# Patient Record
Sex: MALE | Race: ASIAN | NOT HISPANIC OR LATINO | Employment: STUDENT | URBAN - METROPOLITAN AREA
[De-identification: names, ages, dates, MRNs, and addresses within clinical notes are randomized per-mention and may not be internally consistent; named-entity substitution may affect disease eponyms.]

---

## 2019-12-15 ENCOUNTER — NURSE TRIAGE (OUTPATIENT)
Dept: NURSING | Facility: CLINIC | Age: 24
End: 2019-12-15
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
## 2024-07-09 ENCOUNTER — APPOINTMENT (OUTPATIENT)
Dept: GENERAL RADIOLOGY | Facility: CLINIC | Age: 29
End: 2024-07-09
Attending: EMERGENCY MEDICINE
Payer: COMMERCIAL

## 2024-07-09 ENCOUNTER — HOSPITAL ENCOUNTER (EMERGENCY)
Facility: CLINIC | Age: 29
Discharge: HOME | End: 2024-07-09
Attending: EMERGENCY MEDICINE | Admitting: EMERGENCY MEDICINE
Payer: COMMERCIAL

## 2024-07-09 VITALS
OXYGEN SATURATION: 100 % | RESPIRATION RATE: 16 BRPM | DIASTOLIC BLOOD PRESSURE: 77 MMHG | HEART RATE: 66 BPM | SYSTOLIC BLOOD PRESSURE: 135 MMHG | TEMPERATURE: 98.1 F

## 2024-07-09 DIAGNOSIS — S43.004A SHOULDER DISLOCATION, RIGHT, INITIAL ENCOUNTER: ICD-10-CM

## 2024-07-09 PROCEDURE — 99283 EMERGENCY DEPT VISIT LOW MDM: CPT | Performed by: EMERGENCY MEDICINE

## 2024-07-09 PROCEDURE — 73030 X-RAY EXAM OF SHOULDER: CPT | Mod: 26 | Performed by: RADIOLOGY

## 2024-07-09 PROCEDURE — 73030 X-RAY EXAM OF SHOULDER: CPT | Mod: RT

## 2024-07-09 ASSESSMENT — ACTIVITIES OF DAILY LIVING (ADL)
ADLS_ACUITY_SCORE: 33
ADLS_ACUITY_SCORE: 35

## 2024-07-09 ASSESSMENT — COLUMBIA-SUICIDE SEVERITY RATING SCALE - C-SSRS
6. HAVE YOU EVER DONE ANYTHING, STARTED TO DO ANYTHING, OR PREPARED TO DO ANYTHING TO END YOUR LIFE?: NO
2. HAVE YOU ACTUALLY HAD ANY THOUGHTS OF KILLING YOURSELF IN THE PAST MONTH?: NO
1. IN THE PAST MONTH, HAVE YOU WISHED YOU WERE DEAD OR WISHED YOU COULD GO TO SLEEP AND NOT WAKE UP?: NO

## 2024-07-10 NOTE — ED PROVIDER NOTES
[place] EMERGENCY DEPARTMENT ([facility])    7/09/24       ED PROVIDER NOTE       History     Chief Complaint   Patient presents with    Dislocation     HPI  Vasquez Cisneros is a 28 year old left-handed male who presents to the ED with a possible right shoulder dislocation.  Patient reports that he was playing squash, leaned over for a left had a backhand shot that was near the wall, pushed his right hand up against the wall and felt his shoulder pop backwards.  It then spontaneously popped back into place, however this is never happened before for him and he was concerned so he stopped playing and went home.  When he took his shirt off at home, he felt like his shoulder popped out of place again, and then subsequently popped back into place.  He denies weakness or numbness distally.  He has not had any dislocations in the past.  He denies any pain or concerns at rest in the department during interview today.    Patient reports he was playing squash and felt a pop. When taking his shirt off later after the initial pop he heard another popping sound. He has active ROM.     Past Medical History  No past medical history on file.  No past surgical history on file.  No current outpatient medications on file.    No Known Allergies  Family History  No family history on file.  Social History       Past medical history, past surgical history, medications, allergies, family history, and social history were reviewed with the patient. No additional pertinent items.     A complete review of systems was performed with pertinent positives and negatives noted in the HPI, and all other systems negative.    Physical Exam   BP: 135/77  Pulse: 66  Temp: 98.1  F (36.7  C)  Resp: 16  SpO2: 100 %  Physical Exam  Physical Exam   Constitutional: Pt is oriented to person, place, and time.Pt appears well-developed and well-nourished.   HENT:   Head: Normocephalic and atraumatic.   Eyes: Conjunctivae are normal. Pupils are  equal, round, and reactive to light.   Neck: Normal range of motion. Neck supple.   Cardiovascular: Normal rate, regular rhythm, normal heart sounds and intact distal pulses.    Pulmonary/Chest: Effort normal and breath sounds normal. No respiratory distress. Pt has no wheezes. Pt has no rales  Abdominal: Soft. Bowel sounds are normal. Pt exhibits no distension and no mass. No tenderness. Pt has no rebound and no guarding.   Musculoskeletal: Normal range of motion. Pt exhibits no edema.   Neurological: Pt is alert and oriented to person, place, and time. Normal reflexes.   Skin: Skin is warm and dry. No rash noted.   Psychiatric: Pt has a normal mood and affect. Behavior is normal. Judgment and thought content normal.      ED Course, Procedures, & Data      Procedures                Results for orders placed or performed during the hospital encounter of 07/09/24   XR Shoulder Right G/E 3 Views     Status: None    Narrative    EXAM: XR SHOULDER RIGHT G/E 3 VIEWS  LOCATION: [facility]  DATE: 7/9/2024    INDICATION: trauma, possible right shoulder dislocation  COMPARISON: None.      Impression    IMPRESSION: No acute fracture or malalignment. There is a 4 mm chronic appearing ossicle along the anterior glenoid. Normal joint spacing.     Medications - No data to display  Labs Ordered and Resulted from Time of ED Arrival to Time of ED Departure - No data to display  XR Shoulder Right G/E 3 Views   Final Result   IMPRESSION: No acute fracture or malalignment. There is a 4 mm chronic appearing ossicle along the anterior glenoid. Normal joint spacing.             Critical care was not performed.     Medical Decision Making  The patient's presentation was of low complexity (an acute and uncomplicated illness or injury).    The patient's evaluation involved:  review of external note(s) from 1 sources (see separate area of note for details)  ordering and/or review of 1 test(s) in  this encounter (see separate area of note for details)    The patient's management necessitated only low risk treatment.    Assessment & Plan    Vasquez Cisneros is a 28 year old left-handed male who presents to the ED with a possible right shoulder dislocation.  Patient is nontoxic-appearing on exam, has vital signs within normal limits.  He has full range of motion of the shoulder with abduction, adduction, flexion, extension, and no pain with Neer test or empty can test at this time.  He has intact radial pulses 2+ on palpation bilaterally, 5 out of 5  strength bilaterally, intact sensation to light touch in distal upper extremities bilaterally.  Shoulder x-ray was obtained and negative for fracture or dislocation on my read, confirmed by radiology, showed a 4 mm chronic appearing ossicle along the anterior glenoid according to radiology with normal joint spacing. By history it sounds as if patient had a dislocated shoulder twice tonight that spontaneously reduced. Patient given a sling and ortho-sport referral. Discharged with outpatient follow up and return precautions .    I have reviewed the nursing notes. I have reviewed the findings, diagnosis, plan and need for follow up with the patient.    New Prescriptions    No medications on file       Final diagnoses:   Shoulder dislocation, right, initial encounter       Javi Viera MD  [facility] EMERGENCY DEPARTMENT  7/9/2024     Javi Viera MD  07/09/24 2057

## 2024-07-10 NOTE — ED TRIAGE NOTES
Pt arrives to ED with c/o possible right shoulder dislocation. Pt states he was playing squash and felt a pop. Later when taking his shirt of he felt another pop. Pt has active ROM. Endorses some pain. VSS

## 2024-07-14 ENCOUNTER — HEALTH MAINTENANCE LETTER (OUTPATIENT)
Age: 29
End: 2024-07-14

## 2024-07-24 ENCOUNTER — OFFICE VISIT (OUTPATIENT)
Dept: ORTHOPEDICS | Facility: CLINIC | Age: 29
End: 2024-07-24
Payer: COMMERCIAL

## 2024-07-24 ENCOUNTER — PRE VISIT (OUTPATIENT)
Dept: ORTHOPEDICS | Facility: CLINIC | Age: 29
End: 2024-07-24

## 2024-07-24 DIAGNOSIS — S43.001A SHOULDER SUBLUXATION, RIGHT, INITIAL ENCOUNTER: Primary | ICD-10-CM

## 2024-07-24 PROCEDURE — 99203 OFFICE O/P NEW LOW 30 MIN: CPT | Performed by: FAMILY MEDICINE

## 2024-07-24 NOTE — TELEPHONE ENCOUNTER
DIAGNOSIS: XR 7/9/24 Shoulder dislocation, right, initial encounter [S43.004A] / IMAGES IN CHART / Javi Viera MD / MARISEL     APPOINTMENT DATE: 7.24.24   NOTES STATUS DETAILS   DISCHARGE REPORT from the ER Internal 7.9.24  Maximiliano  [facility]   MEDICATION LIST Internal    XRAYS (IMAGES & REPORTS) Internal 7.9.24  XR Shoulder Right

## 2024-07-24 NOTE — PROGRESS NOTES
Patient acute right shoulder subluxation/dislocation episode 7/9/2024.  Playing squash, pushed his right hand up against a wall and felt his shoulder pop.  Denies a history of previous shoulder dislocation. Today, the patient reports that after that injury, he stopped playing immediately. Denies any previous episodes of instability. Denies any paresthesias. He does note improvement in his shoulder pain. He has been using a sling since being seen in the ED, he does come out of the sling from time to time to move the elbow. He is left hand dominant. He does enjoy playing squash and going to the gym.     X-ray 7/9/2024 showed no acute fracture.  Chronic appearing 4 mm ossicle at the anterior glenoid but no sign of Bankart lesion        EXAM: XR SHOULDER RIGHT G/E 3 VIEWS  LOCATION: [facility]  DATE: 7/9/2024     INDICATION: trauma, possible right shoulder dislocation  COMPARISON: None.                                                                      IMPRESSION: No acute fracture or malalignment. There is a 4 mm chronic appearing ossicle along the anterior glenoid. Normal joint spacing.            PMH:  No past medical history on file.    Active problem list:  There is no problem list on file for this patient.      FH:  No family history on file.    SH:  Social History     Socioeconomic History    Marital status:      Spouse name: Not on file    Number of children: Not on file    Years of education: Not on file    Highest education level: Not on file   Occupational History    Not on file   Tobacco Use    Smoking status: Not on file    Smokeless tobacco: Not on file   Substance and Sexual Activity    Alcohol use: Not on file    Drug use: Not on file    Sexual activity: Not on file   Other Topics Concern    Not on file   Social History Narrative    Not on file     Social Determinants of Health     Financial Resource Strain: Not on file   Food Insecurity: Not on file    Transportation Needs: Not on file   Physical Activity: Not on file   Stress: Not on file   Social Connections: Not on file   Interpersonal Safety: Not on file   Housing Stability: Not on file       MEDS:  See EMR, reviewed  ALL:  See EMR, reviewed    REVIEW OF SYSTEMS:  CONSTITUTIONAL:NEGATIVE for fever, chills, change in weight  INTEGUMENTARY/SKIN: NEGATIVE for worrisome rashes, moles or lesions  EYES: NEGATIVE for vision changes or irritation  ENT/MOUTH: NEGATIVE for ear, mouth and throat problems  RESP:NEGATIVE for significant cough or SOB  BREAST: NEGATIVE for masses, tenderness or discharge  CV: NEGATIVE for chest pain, palpitations or peripheral edema  GI: NEGATIVE for nausea, abdominal pain, heartburn, or change in bowel habits  :NEGATIVE for frequency, dysuria, or hematuria  :NEGATIVE for frequency, dysuria, or hematuria  NEURO: NEGATIVE for weakness, dizziness or paresthesias  ENDOCRINE: NEGATIVE for temperature intolerance, skin/hair changes  HEME/ALLERGY/IMMUNE: NEGATIVE for bleeding problems  PSYCHIATRIC: NEGATIVE for changes in mood or affect        Objective : He can forward flex 140 degrees and abduct 90 degrees.  5 out of 5 strength bilaterally at deltoid, supraspinatus, infraspinatus and subscapularis.  Axillary nerve function is normal on the right.  Nontender over the SCJ joint, clavicle, AC joint, anterior cuff or biceps tendon on the right.  [place]'s test is negative.  No scapular winging.  Overlying skin is normal.  Appropriate conversation and affect.     Assessment: Right shoulder subluxation     Plan: I asked him to discontinue the sling and was taught Codman's exercises to avoid frozen shoulder.  He will see physical therapy for a scapular stabilization and shoulder stabilization exercise protocol that can progress him back to playing squash.  If he does not continue to improve over the next 6 weeks he can return and if necessary an MRI to evaluate his labrum could be considered.   Follow-up if not improved.

## 2024-07-24 NOTE — LETTER
7/24/2024      RE: Vasquez Cisneros  [address]  [place] [address]     Dear Colleague,    Thank you for referring your patient, Vasquez Cisneros, to the [facility] [place]. Please see a copy of my visit note below.    Patient acute right shoulder subluxation/dislocation episode 7/9/2024.  Playing squash, pushed his right hand up against a wall and felt his shoulder pop.  Denies a history of previous shoulder dislocation. Today, the patient reports that after that injury, he stopped playing immediately. Denies any previous episodes of instability. Denies any paresthesias. He does note improvement in his shoulder pain. He has been using a sling since being seen in the ED, he does come out of the sling from time to time to move the elbow. He is left hand dominant. He does enjoy playing squash and going to the gym.     X-ray 7/9/2024 showed no acute fracture.  Chronic appearing 4 mm ossicle at the anterior glenoid but no sign of Bankart lesion        EXAM: XR SHOULDER RIGHT G/E 3 VIEWS  LOCATION: [facility]  DATE: 7/9/2024     INDICATION: trauma, possible right shoulder dislocation  COMPARISON: None.                                                                      IMPRESSION: No acute fracture or malalignment. There is a 4 mm chronic appearing ossicle along the anterior glenoid. Normal joint spacing.            PMH:  No past medical history on file.    Active problem list:  There is no problem list on file for this patient.      FH:  No family history on file.    SH:  Social History     Socioeconomic History     Marital status:      Spouse name: Not on file     Number of children: Not on file     Years of education: Not on file     Highest education level: Not on file   Occupational History     Not on file   Tobacco Use     Smoking status: Not on file     Smokeless tobacco: Not on file   Substance and  Sexual Activity     Alcohol use: Not on file     Drug use: Not on file     Sexual activity: Not on file   Other Topics Concern     Not on file   Social History Narrative     Not on file     Social Determinants of Health     Financial Resource Strain: Not on file   Food Insecurity: Not on file   Transportation Needs: Not on file   Physical Activity: Not on file   Stress: Not on file   Social Connections: Not on file   Interpersonal Safety: Not on file   Housing Stability: Not on file       MEDS:  See EMR, reviewed  ALL:  See EMR, reviewed    REVIEW OF SYSTEMS:  CONSTITUTIONAL:NEGATIVE for fever, chills, change in weight  INTEGUMENTARY/SKIN: NEGATIVE for worrisome rashes, moles or lesions  EYES: NEGATIVE for vision changes or irritation  ENT/MOUTH: NEGATIVE for ear, mouth and throat problems  RESP:NEGATIVE for significant cough or SOB  BREAST: NEGATIVE for masses, tenderness or discharge  CV: NEGATIVE for chest pain, palpitations or peripheral edema  GI: NEGATIVE for nausea, abdominal pain, heartburn, or change in bowel habits  :NEGATIVE for frequency, dysuria, or hematuria  :NEGATIVE for frequency, dysuria, or hematuria  NEURO: NEGATIVE for weakness, dizziness or paresthesias  ENDOCRINE: NEGATIVE for temperature intolerance, skin/hair changes  HEME/ALLERGY/IMMUNE: NEGATIVE for bleeding problems  PSYCHIATRIC: NEGATIVE for changes in mood or affect        Objective : He can forward flex 140 degrees and abduct 90 degrees.  5 out of 5 strength bilaterally at deltoid, supraspinatus, infraspinatus and subscapularis.  Axillary nerve function is normal on the right.  Nontender over the SCJ joint, clavicle, AC joint, anterior cuff or biceps tendon on the right.  Teton's test is negative.  No scapular winging.  Overlying skin is normal.  Appropriate conversation and affect.     Assessment: Right shoulder subluxation     Plan: I asked him to discontinue the sling and was taught Codman's exercises to avoid frozen shoulder.   He will see physical therapy for a scapular stabilization and shoulder stabilization exercise protocol that can progress him back to playing squash.  If he does not continue to improve over the next 6 weeks he can return and if necessary an MRI to evaluate his labrum could be considered.  Follow-up if not improved.                      Again, thank you for allowing me to participate in the care of your patient.      Sincerely,    Baltazar Mohr MD

## 2024-08-11 ASSESSMENT — ACTIVITIES OF DAILY LIVING (ADL)
TOUCHING_THE_BACK_OF_YOUR_NECK: 1
REACHING_FOR_SOMETHING_ON_A_HIGH_SHELF: 2
PUTTING_ON_YOUR_PANTS: 1
WASHING_YOUR_BACK: 2
WHEN_LYING_ON_THE_INVOLVED_SIDE: 2
PUTTING_ON_AN_UNDERSHIRT_OR_A_PULLOVER_SWEATER: 1
AT_ITS_WORST?: 3
REMOVING_SOMETHING_FROM_YOUR_BACK_POCKET: 1
CARRYING_A_HEAVY_OBJECT_OF_10_POUNDS: 2
PUTTING_ON_A_SHIRT_THAT_BUTTONS_DOWN_THE_FRONT: 0
PLEASE_INDICATE_YOR_PRIMARY_REASON_FOR_REFERRAL_TO_THERAPY:: SHOULDER
WASHING_YOUR_HAIR?: 0
PLACING_AN_OBJECT_ON_A_HIGH_SHELF: 2
PUSHING_WITH_THE_INVOLVED_ARM: 2

## 2024-08-12 ENCOUNTER — THERAPY VISIT (OUTPATIENT)
Dept: PHYSICAL THERAPY | Facility: CLINIC | Age: 29
End: 2024-08-12
Payer: COMMERCIAL

## 2024-08-12 DIAGNOSIS — S43.001A SHOULDER SUBLUXATION, RIGHT, INITIAL ENCOUNTER: ICD-10-CM

## 2024-08-12 PROCEDURE — 97112 NEUROMUSCULAR REEDUCATION: CPT | Mod: GP | Performed by: PHYSICAL THERAPIST

## 2024-08-12 PROCEDURE — 97530 THERAPEUTIC ACTIVITIES: CPT | Mod: GP | Performed by: PHYSICAL THERAPIST

## 2024-08-12 PROCEDURE — 97161 PT EVAL LOW COMPLEX 20 MIN: CPT | Mod: GP | Performed by: PHYSICAL THERAPIST

## 2024-08-12 NOTE — PROGRESS NOTES
PHYSICAL THERAPY EVALUATION  Type of Visit: Evaluation              Subjective  Vasquez reports subluxing his right shoulder on July 9, 2024. He was playing squash and was running toward a wall and pushing off wall. New problem and presently complains of intermittent right anterior shoulder pain with no radiation. Pain is 2/10 and intermittent. Worse with reaching  arm out to the side. Better with rest.  He wore a sling for 2 weeks and then discontinued. No formal treatment.     Xray: IMPRESSION: No acute fracture or malalignment. There is a 4 mm chronic appearing ossicle along the anterior glenoid. Normal joint spacing.       Presenting condition or subjective complaint: Shoulder subluxation  Date of onset:      Relevant medical history:     Dates & types of surgery:      Prior diagnostic imaging/testing results: X-ray     Prior therapy history for the same diagnosis, illness or injury: No      Prior Level of Function  Transfers: Independent  Ambulation: Independent  ADL: Independent  IADL:  showering and sleeping     Living Environment  Social support: With a significant other or spouse   Type of home: Apartment/condo   Stairs to enter the home: Yes       Ramp:     Stairs inside the home:         Help at home: Self Cares (home health aide/personal care attendant, family, etc)  Equipment owned:       Employment: Yes Research Assistant (Student)  Hobbies/Interests:      Patient goals for therapy: Workout, Play squash    Pain assessment:  see subjective      Objective   SHOULDER EVALUATION  PAIN:  see subjective  INTEGUMENTARY (edema, incisions):  na  POSTURE:   right protracted scapula, left elevated scapula  GAIT:   Weightbearing Status:  na  Assistive Device(s):  na  Gait Deviations:  na  BALANCE/PROPRIOCEPTION:  na  WEIGHTBEARING ALIGNMENT:  na  ROM:   (Degrees) Left AROM Left PROM Right AROM  Right PROM   Shoulder Flexion 0  170 with end range tightness    Shoulder Extension wnl  wnl    Shoulder Abduction wnl   130-168 with tightness and then onset of shoulder pain at end of range    Shoulder Adduction       Shoulder Internal Rotation wnl  wnl    Shoulder External Rotation wnl  35 with end range tightness     Shoulder Horizontal Abduction       Shoulder Horizontal Adduction       Shoulder Flexion ER       Shoulder Flexion IR       Elbow Extension wnl  wnl    Elbow Flexion wnl  wnl    Pain:   End feel:    na  STRENGTH:   Pain: - none + mild ++ moderate +++ severe  Strength Scale: 0-5/5 Left Right   Shoulder Flexion 5 4+   Shoulder Extension 5 4+   Shoulder Abduction 5 4+   Shoulder Adduction  na  na   Shoulder Internal Rotation 5 4+   Shoulder External Rotation 5 4+   Shoulder Horizontal Abduction  na  na   Shoulder Horizontal Adduction 5 4+   Elbow Flexion 5 4+   Elbow Extension  na  na   Mid Trap  na  na   Lower Trap  na  na   Rhomboid  na  na   Serratus Anterior  na  na     FLEXIBILITY: na  SPECIAL TESTS:  na  PALPATION:  right upper trapeziuis and levator scapula muscle tightness   JOINT MOBILITY:    Left Right   Glenohumeral anterior  Wnl  Hypermobile   Glenohumeral posterior WNL WNL   Glenohumeral inferior WNL WNL   Acromioclavicular WNL WNL   Scapulothoracic na na   Sternoclavicular WNL WNL   Scapulohumeral rhythm WNL Hypermobile     CERVICAL SCREEN:   unremarkable    Assessment & Plan   CLINICAL IMPRESSIONS  Medical Diagnosis:      Treatment Diagnosis:     Impression/Assessment: Patient is a 28 year old male with  right shoulder complaints.  The following significant findings have been identified: Pain, Decreased ROM/flexibility, Decreased strength, Impaired muscle performance, Decreased activity tolerance, and joint hypermobility . These impairments interfere with their ability to perform  sleeping and showering as compared to previous level of function.     Clinical Decision Making (Complexity):  Clinical Presentation: Stable/Uncomplicated  Clinical Presentation Rationale: based on medical and personal factors  listed in PT evaluation  Clinical Decision Making (Complexity): Low complexity    PLAN OF CARE  Treatment Interventions:  Modalities: Cryotherapy  Interventions: Manual Therapy, Neuromuscular Re-education, Therapeutic Activity, Therapeutic Exercise, Self-Care/Home Management    Long Term Goals            Frequency of Treatment:    Duration of Treatment:      Recommended Referrals to Other Professionals:  none needed  Education Assessment:        Risks and benefits of evaluation/treatment have been explained.   Patient/Family/caregiver agrees with Plan of Care.     Evaluation Time:         Na      Signing Clinician: Gemini Jenkins, PT

## 2024-08-19 ENCOUNTER — THERAPY VISIT (OUTPATIENT)
Dept: PHYSICAL THERAPY | Facility: CLINIC | Age: 29
End: 2024-08-19
Payer: COMMERCIAL

## 2024-08-19 DIAGNOSIS — S43.001A SHOULDER SUBLUXATION, RIGHT, INITIAL ENCOUNTER: Primary | ICD-10-CM

## 2024-08-19 PROCEDURE — 97110 THERAPEUTIC EXERCISES: CPT | Mod: GP | Performed by: PHYSICAL THERAPIST

## 2024-08-19 PROCEDURE — 97112 NEUROMUSCULAR REEDUCATION: CPT | Mod: GP | Performed by: PHYSICAL THERAPIST

## 2024-08-26 ENCOUNTER — THERAPY VISIT (OUTPATIENT)
Dept: PHYSICAL THERAPY | Facility: CLINIC | Age: 29
End: 2024-08-26
Payer: COMMERCIAL

## 2024-08-26 DIAGNOSIS — S43.001A SHOULDER SUBLUXATION, RIGHT, INITIAL ENCOUNTER: Primary | ICD-10-CM

## 2024-08-26 PROCEDURE — 97110 THERAPEUTIC EXERCISES: CPT | Mod: GP | Performed by: PHYSICAL THERAPIST

## 2024-08-26 PROCEDURE — 97112 NEUROMUSCULAR REEDUCATION: CPT | Mod: GP | Performed by: PHYSICAL THERAPIST

## 2024-08-26 PROCEDURE — 97140 MANUAL THERAPY 1/> REGIONS: CPT | Mod: GP | Performed by: PHYSICAL THERAPIST

## 2024-09-09 ENCOUNTER — THERAPY VISIT (OUTPATIENT)
Dept: PHYSICAL THERAPY | Facility: CLINIC | Age: 29
End: 2024-09-09
Payer: COMMERCIAL

## 2024-09-09 DIAGNOSIS — S43.001A SHOULDER SUBLUXATION, RIGHT, INITIAL ENCOUNTER: Primary | ICD-10-CM

## 2024-09-09 PROCEDURE — 97140 MANUAL THERAPY 1/> REGIONS: CPT | Mod: GP | Performed by: PHYSICAL THERAPIST

## 2024-09-09 PROCEDURE — 97112 NEUROMUSCULAR REEDUCATION: CPT | Mod: GP | Performed by: PHYSICAL THERAPIST

## 2024-09-17 ENCOUNTER — THERAPY VISIT (OUTPATIENT)
Dept: PHYSICAL THERAPY | Facility: CLINIC | Age: 29
End: 2024-09-17
Payer: COMMERCIAL

## 2024-09-17 DIAGNOSIS — S43.001A SHOULDER SUBLUXATION, RIGHT, INITIAL ENCOUNTER: Primary | ICD-10-CM

## 2024-09-17 PROCEDURE — 97140 MANUAL THERAPY 1/> REGIONS: CPT | Mod: GP | Performed by: PHYSICAL THERAPIST

## 2024-09-17 PROCEDURE — 97112 NEUROMUSCULAR REEDUCATION: CPT | Mod: GP | Performed by: PHYSICAL THERAPIST

## 2024-09-23 ENCOUNTER — THERAPY VISIT (OUTPATIENT)
Dept: PHYSICAL THERAPY | Facility: CLINIC | Age: 29
End: 2024-09-23
Payer: COMMERCIAL

## 2024-09-23 DIAGNOSIS — S43.001A SHOULDER SUBLUXATION, RIGHT, INITIAL ENCOUNTER: Primary | ICD-10-CM

## 2024-09-23 PROCEDURE — 97110 THERAPEUTIC EXERCISES: CPT | Mod: GP | Performed by: PHYSICAL THERAPIST

## 2024-09-23 PROCEDURE — 97140 MANUAL THERAPY 1/> REGIONS: CPT | Mod: GP | Performed by: PHYSICAL THERAPIST

## 2024-10-22 ENCOUNTER — THERAPY VISIT (OUTPATIENT)
Dept: PHYSICAL THERAPY | Facility: CLINIC | Age: 29
End: 2024-10-22
Payer: COMMERCIAL

## 2024-10-22 DIAGNOSIS — S43.001A SHOULDER SUBLUXATION, RIGHT, INITIAL ENCOUNTER: Primary | ICD-10-CM

## 2024-10-22 PROCEDURE — 97140 MANUAL THERAPY 1/> REGIONS: CPT | Mod: GP | Performed by: PHYSICAL THERAPIST

## 2024-10-22 PROCEDURE — 97110 THERAPEUTIC EXERCISES: CPT | Mod: GP | Performed by: PHYSICAL THERAPIST

## 2024-10-29 ENCOUNTER — THERAPY VISIT (OUTPATIENT)
Dept: PHYSICAL THERAPY | Facility: CLINIC | Age: 29
End: 2024-10-29
Payer: COMMERCIAL

## 2024-10-29 DIAGNOSIS — S43.001A SHOULDER SUBLUXATION, RIGHT, INITIAL ENCOUNTER: Primary | ICD-10-CM

## 2024-10-29 PROCEDURE — 97110 THERAPEUTIC EXERCISES: CPT | Mod: GP | Performed by: PHYSICAL THERAPIST

## 2024-10-29 PROCEDURE — 97140 MANUAL THERAPY 1/> REGIONS: CPT | Mod: GP | Performed by: PHYSICAL THERAPIST

## 2024-11-05 ENCOUNTER — THERAPY VISIT (OUTPATIENT)
Dept: PHYSICAL THERAPY | Facility: CLINIC | Age: 29
End: 2024-11-05
Payer: COMMERCIAL

## 2024-11-05 DIAGNOSIS — S43.001A SHOULDER SUBLUXATION, RIGHT, INITIAL ENCOUNTER: Primary | ICD-10-CM

## 2024-11-05 PROCEDURE — 97110 THERAPEUTIC EXERCISES: CPT | Mod: GP | Performed by: PHYSICAL THERAPIST

## 2024-11-05 PROCEDURE — 97140 MANUAL THERAPY 1/> REGIONS: CPT | Mod: GP | Performed by: PHYSICAL THERAPIST

## 2024-12-04 ENCOUNTER — THERAPY VISIT (OUTPATIENT)
Dept: PHYSICAL THERAPY | Facility: CLINIC | Age: 29
End: 2024-12-04
Payer: COMMERCIAL

## 2024-12-04 DIAGNOSIS — S43.001A SHOULDER SUBLUXATION, RIGHT, INITIAL ENCOUNTER: Primary | ICD-10-CM

## 2024-12-04 PROCEDURE — 97110 THERAPEUTIC EXERCISES: CPT | Mod: GP | Performed by: PHYSICAL THERAPIST

## 2024-12-04 PROCEDURE — 97530 THERAPEUTIC ACTIVITIES: CPT | Mod: GP | Performed by: PHYSICAL THERAPIST

## 2024-12-04 PROCEDURE — 97140 MANUAL THERAPY 1/> REGIONS: CPT | Mod: GP | Performed by: PHYSICAL THERAPIST

## 2024-12-04 NOTE — PROGRESS NOTES
DISCHARGE REPORT    Progress reporting period is from 8- to 12-4-2024.       SUBJECTIVE  Subjective changes noted by patient:  Overall improvement has been significant. L ifting 0-10# with 1/10 pain level       Current pain level is 1/10  .     Previous pain level was  2/10  .   Changes in function:  Yes (See Goal flowsheet attached for changes in current functional level)  Adverse reaction to treatment or activity: None    OBJECTIVE  Changes noted in objective findings:      ROM:   (Degrees) Left AROM Left PROM Right AROM  Right PROM   Shoulder Flexion 0   178      Shoulder Extension wnl   wnl     Shoulder Abduction wnl   130-168 with tightness and then onset of shoulder pain at end of range     Shoulder Adduction           Shoulder Internal Rotation wnl   wnl     Shoulder External Rotation wnl   78       Shoulder Horizontal Abduction           Shoulder Horizontal Adduction           Shoulder Flexion ER           Shoulder Flexion IR           Elbow Extension wnl   wnl     Elbow Flexion wnl   wnl     Pain:   End feel:    na  STRENGTH:   Pain: - none + mild ++ moderate +++ severe  Strength Scale: 0-5/5 Left Right   Shoulder Flexion 5 5   Shoulder Extension 5 5   Shoulder Abduction 5 5   Shoulder Adduction  na  na   Shoulder Internal Rotation 5 5   Shoulder External Rotation 5 5-   Shoulder Horizontal Abduction  na  na   Shoulder Horizontal Adduction 5 5-/5-5/5   Elbow Flexion 5 5   Elbow Extension  na  na   Mid Trap  na  na   Lower Trap  na  na   Rhomboid  na  na   Serratus Anterior  na  na    middle trapezius: 4/5 (R)           ASSESSMENT/PLAN  Updated problem list and treatment plan: Diagnosis 1:  Shoulder subluxation, right, initial encounter   Decreased ROM/flexibility - manual therapy, therapeutic exercise, therapeutic activity, and home program  Decreased strength - therapeutic exercise, therapeutic activities, and home program  Impaired muscle performance - neuro re-education and home program  Decreased  function - therapeutic activities and home program  STG/LTGs have been met or progress has been made towards goals:  Yes (See Goal flow sheet completed today.)  Assessment of Progress: The patient's condition is improving.  Self Management Plans:  Patient  has been instructed in self management of symptoms.  I have re-evaluated this patient and find that the nature, scope, duration and intensity of the therapy is appropriate for the medical condition of the patient.  Vasquez continues to require the following intervention to meet STG and LTG's:  PT intervention is no longer required to meet STG/LTG.    Recommendations:  This patient is ready to be discharged from therapy and continue their home treatment program.    Please refer to the daily flowsheet for treatment today, total treatment time and time spent performing 1:1 timed codes.

## 2025-07-19 ENCOUNTER — HEALTH MAINTENANCE LETTER (OUTPATIENT)
Age: 30
End: 2025-07-19